# Patient Record
Sex: FEMALE | HISPANIC OR LATINO
[De-identification: names, ages, dates, MRNs, and addresses within clinical notes are randomized per-mention and may not be internally consistent; named-entity substitution may affect disease eponyms.]

---
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
## 2019-08-04 ENCOUNTER — NURSE ONLY (OUTPATIENT)
Dept: CARDIOLOGY CLINIC | Age: 19
End: 2019-08-04
Payer: COMMERCIAL

## 2019-08-04 DIAGNOSIS — Z02.5 ROUTINE SPORTS PHYSICAL EXAM: Primary | ICD-10-CM

## 2019-08-04 PROCEDURE — 93000 ELECTROCARDIOGRAM COMPLETE: CPT | Performed by: INTERNAL MEDICINE

## 2019-10-25 ENCOUNTER — HOSPITAL ENCOUNTER (OUTPATIENT)
Dept: PHYSICAL THERAPY | Age: 19
Setting detail: THERAPIES SERIES
Discharge: HOME | End: 2019-10-25
Payer: COMMERCIAL

## 2019-10-25 PROCEDURE — 97110 THERAPEUTIC EXERCISES: CPT

## 2019-10-25 PROCEDURE — 97161 PT EVAL LOW COMPLEX 20 MIN: CPT

## 2019-10-25 PROCEDURE — 97140 MANUAL THERAPY 1/> REGIONS: CPT

## 2019-10-28 ENCOUNTER — HOSPITAL ENCOUNTER (OUTPATIENT)
Dept: PHYSICAL THERAPY | Age: 19
Setting detail: THERAPIES SERIES
Discharge: HOME | End: 2019-10-28
Payer: COMMERCIAL

## 2019-10-28 PROCEDURE — 97110 THERAPEUTIC EXERCISES: CPT

## 2019-10-28 PROCEDURE — 97140 MANUAL THERAPY 1/> REGIONS: CPT

## 2019-11-04 ENCOUNTER — HOSPITAL ENCOUNTER (OUTPATIENT)
Dept: PHYSICAL THERAPY | Age: 19
Setting detail: THERAPIES SERIES
Discharge: HOME | End: 2019-11-04
Payer: COMMERCIAL

## 2019-11-04 PROCEDURE — 97110 THERAPEUTIC EXERCISES: CPT

## 2019-11-04 PROCEDURE — 97140 MANUAL THERAPY 1/> REGIONS: CPT

## 2019-11-11 ENCOUNTER — HOSPITAL ENCOUNTER (OUTPATIENT)
Dept: PHYSICAL THERAPY | Age: 19
Setting detail: THERAPIES SERIES
Discharge: HOME | End: 2019-11-11
Payer: COMMERCIAL

## 2019-11-18 ENCOUNTER — HOSPITAL ENCOUNTER (OUTPATIENT)
Dept: PHYSICAL THERAPY | Age: 19
Setting detail: THERAPIES SERIES
Discharge: HOME | End: 2019-11-18
Payer: COMMERCIAL

## 2019-11-20 ENCOUNTER — HOSPITAL ENCOUNTER (OUTPATIENT)
Dept: PHYSICAL THERAPY | Age: 19
Setting detail: THERAPIES SERIES
Discharge: HOME | End: 2019-11-20
Payer: COMMERCIAL

## 2019-11-20 PROCEDURE — 97110 THERAPEUTIC EXERCISES: CPT

## 2019-11-20 PROCEDURE — 97140 MANUAL THERAPY 1/> REGIONS: CPT

## 2019-11-25 ENCOUNTER — HOSPITAL ENCOUNTER (OUTPATIENT)
Dept: PHYSICAL THERAPY | Age: 19
Setting detail: THERAPIES SERIES
Discharge: HOME | End: 2019-11-25
Payer: COMMERCIAL

## 2019-11-25 PROCEDURE — 97110 THERAPEUTIC EXERCISES: CPT

## 2019-11-25 PROCEDURE — 97140 MANUAL THERAPY 1/> REGIONS: CPT

## 2019-12-04 ENCOUNTER — HOSPITAL ENCOUNTER (OUTPATIENT)
Dept: PHYSICAL THERAPY | Age: 19
Setting detail: THERAPIES SERIES
Discharge: HOME | End: 2019-12-04
Payer: COMMERCIAL

## 2019-12-04 PROCEDURE — 97110 THERAPEUTIC EXERCISES: CPT

## 2020-12-03 ENCOUNTER — HOSPITAL ENCOUNTER (OUTPATIENT)
Dept: PHYSICAL THERAPY | Age: 20
Setting detail: THERAPIES SERIES
Discharge: HOME | End: 2020-12-03
Payer: COMMERCIAL

## 2020-12-03 PROCEDURE — 97140 MANUAL THERAPY 1/> REGIONS: CPT

## 2020-12-03 PROCEDURE — 97161 PT EVAL LOW COMPLEX 20 MIN: CPT

## 2020-12-03 NOTE — FLOWSHEET NOTE
Saccades-Horizontal     Saccades-Vertical     VOR-Hoirzontal     VOR-Vertical     Convergence     Visual Motion Sensitivity  (VMS)                 Therapeutic Ex: 3 min Sets/sec Reps Notes   UBE      T- band Row/pinch      T- band lower pinch      T- band ER activation      UT stretch      Levator stretch      Isometric at wall      Quadruped w cerv retract      Front plank      Side plank      Chin tuck      Chin tuck w lift      Chin tuck w rotation 20 4                Manual Intervention: 20 min      Cerv mobs/manip 1 8    Thoracic mobs/manip 1 4    CT manip 1 4    Rib mobilizations      STM 1 4    DN            NMR re-education      T-spine Ext- foam roll      Chin tucks       No money      Wall Postural re-ed                      Therapeutic Exercise and NMR EXR  [x] (24064) Provided verbal/tactile cueing for activities related to strengthening, flexibility, endurance, ROM  for improvements in cervical, postural, scapular, scapulothoracic and UE control with self care, reaching, carrying, lifting, house/yardwork, driving/computer work. [x] (70694) Provided verbal/tactile cueing for activities related to improving balance, coordination, kinesthetic sense, posture, motor skill, proprioception  to assist with cervical, scapular, scapulothoracic and UE control with self care, reaching, carrying, lifting, house/yardwork, driving/computer work. Therapeutic Activities:    [] (51516 or 90848) Provided verbal/tactile cueing for activities related to improving balance, coordination, kinesthetic sense, posture, motor skill, proprioception and motor activation to allow for proper function of cervical, scapular, scapulothoracic and UE control with self care, carrying, lifting, driving/computer work.      Home Exercise Program:    [x] (19318) Reviewed/Progressed HEP activities related to strengthening, flexibility, endurance, ROM of cervical, scapular, scapulothoracic and UE control with self care, reaching, carrying, lifting, house/yardwork, driving/computer work  [] (08075) Reviewed/Progressed HEP activities related to improving balance, coordination, kinesthetic sense, posture, motor skill, proprioception of cervical, scapular, scapulothoracic and UE control with self care, reaching, carrying, lifting, house/yardwork, driving/computer work      Manual Treatments:  PROM / STM / Oscillations-Mobs:  G-I, II, III, IV (PA's, Inf., Post.)  [x] (80992) Provided manual therapy to mobilize soft tissue/joints of cervical/CT, scapular GHJ and UE for the purpose of decreasing headache, modulating pain, promoting relaxation,  increasing ROM, reducing/eliminating soft tissue swelling/inflammation/restriction, improving soft tissue extensibility and allowing for proper ROM for normal function with self care, reaching, carrying, lifting, house/yardwork, driving/computer work    Modalities:      Charges:  Timed Code Treatment Minutes: 20   Total Treatment Minutes: 45     [x] EVAL (LOW) 27498 (typically 20 minutes face-to-face)  [] EVAL (MOD) 91251 (typically 30 minutes face-to-face)  [] EVAL (HIGH) 63506 (typically 45 minutes face-to-face)  [] RE-EVAL     [] WH(89328) x     [] Dry needle 1 or 2 Muscles (04579)  [] NMR (92369) x     [] Dry needle 3+ Muscles (07785)  [x] Manual (96770) x  1     [] TA (86857) x     [] Mech Traction (61968)  [] ES(attended) (36678)     [] ES (un) (19659):   [] VASO (98768)  [] Other:      If [facility] Please Indicate Time In/Out  CPT Code Time in Time out                                     GOALS:  Patient stated goal: improve mobility; sleep better  [] Progressing: [] Met: [] Not Met: [] Adjusted    Therapist goals for Patient:   Short Term Goals: To be achieved in: 2 weeks  1. Independent in HEP and progression per patient tolerance, in order to prevent re-injury. [] Progressing: [] Met: [] Not Met: [] Adjusted  2.  Patient will have a decrease in pain and concussion symptoms to facilitate improvement in movement, function, and ADLs as indicated by Functional Deficits. [] Progressing: [] Met: [] Not Met: [] Adjusted    Long Term Goals: To be achieved in: 2 weeks  1. Disability index score of 0% or less for the NDI to assist with reaching prior level of function. [] Progressing: [] Met: [] Not Met: [] Adjusted  2. Patient will demonstrate increased AROM to [facility] of cervical/thoracic spine to allow for proper joint functioning as indicated by patients Functional Deficits. [] Progressing: [] Met: [] Not Met: [] Adjusted  3. Patient will demonstrate an increase in postural awareness and control and activation of  Deep cervical stabilizers to allow for proper functional mobility as indicated by patients Functional Deficits. [] Progressing: [] Met: [] Not Met: [] Adjusted  4. Patient will return to lifting/reaching functional activities without increased symptoms or restriction. [] Progressing: [] Met: [] Not Met: [] Adjusted  5. Patient to have normalized concussion symptoms to facilitate participation in movement, function, ADLs. [] Progressing: [] Met: [] Not Met: [] Adjusted   6. Patient will report being able to sleep without increased symptoms or restriction. (patient specific functional goal)    [] Progressing: [] Met: [] Not Met: [] Adjusted     ASSESSMENT:  See eval    Treatment/Activity Tolerance:  [x] Patient tolerated treatment well [] Patient limited by fatique  [] Patient limited by pain  [] Patient limited by other medical complications  [] Other:     Overall Progression Towards Functional goals/ Treatment Progress Update:  [] Patient is progressing as expected towards functional goals listed. [] Progression is slowed due to complexities/Impairments listed. [] Progression has been slowed due to co-morbidities.   [x] Plan just implemented, too soon to assess goals progression <30days   [] Goals require adjustment due to lack of progress  [] Patient is not progressing as expected and requires additional follow up with physician  [] Other    Prognosis for POC: [x] Good [] Fair  [] Poor    Patient requires continued skilled intervention: [x] Yes  [] No        PLAN: See eval  [] Continue per plan of care [] Alter current plan (see comments)  [x] Plan of care initiated [] Hold pending MD visit [] Discharge    Electronically signed by: Nishi Bryant PT    Note: If patient does not return for scheduled/recommended follow up visits, this note will serve as a discharge from care along with the most recent update on progress.

## 2020-12-03 NOTE — PLAN OF CARE
[place] [address] [place] [address]ra  Phone: [phone]  Fax:     [phone]         Physical Therapy Certification    Dear Referring Practitioner: Dr. Floyd Aguiar,    We had the pleasure of evaluating the following patient for physical therapy services at [address] [place] [address]. A summary of our findings can be found in the initial assessment below. This includes our plan of care. If you have any questions or concerns regarding these findings, please do not hesitate to contact me at the office phone number checked above. Thank you for the referral.       Physician Signature:_______________________________Date:__________________  By signing above (or electronic signature), therapists plan is approved by physician            Patient: Natalie Green   : 2000   MRN: [id number]  Referring Physician: Referring Practitioner: Dr. Floyd Aguiar      Evaluation Date: 12/3/2020      Medical Diagnosis Information:  Diagnosis: concussion, cervical sprin, neck pain   Treatment Diagnosis: concussion S06.0X0A, neck pain M54.2, cervical sprain S13. 4XXA                                         Insurance information: PT Insurance Information: Cign[employer]/AG/Golden    Precautions/ Contra-indications: none  Latex Allergy:  [x]NO      []YES  Preferred Language for Healthcare:   [x]English       []other:    C-SSRS Triggered by Intake questionnaire (Past 2 wk assessment ):   [x] No, Questionnaire did not trigger screening.   [] Yes, Patient intake triggered C-SSRS Screening      [] C-SSRS Screening completed  [] PCP notified via Epic     SUBJECTIVE: Patient stated complaint: Patient reports that she fell on the ice 2020 and hit her chin on the ice, which resulted in head whipping back. Patient reports that initially she had HA, increased light sensitivity, foggy feeling. Denied dizziness or sound sensitivity. Most of her concussion symptoms resolved 2 days ago, except for HA. Mostly has R sided neck pain and feels like her HA is a result of this. Her  worked on some soft tissue in neck over the past week which has helped quite a bit. Will be leaving to go home this weekend as her season just got canceled last night. Difficulty sleeping.      Relevant Medical History:prior concussion/cervical strain 2019  Functional Scale/Score: DHI 0% disability, NDI 26% disability    Pain Scale: 4/10  Easing factors: lying down  Provocative factors: moving neck     Type: [x]Constant   [x]Intermittent  []Radiating []Localized []other:     Numbness/Tingling: none    Occupation/School:student, [employer]     Living Status/Prior Level of Function: Independent with ADLs and IADLs, varsity skating    OBJECTIVE:     Special Test Results Symptoms and Time until Resolution of Symptoms   Dizziness Handicap Inventory 0% disability    STUART Level ground- 4 errors  Unlevel ground- 5 errors                  VOMS Not Tested Headache 0-10 Dizziness 0-10 Nausea 0-10 Fogginess 0-10 Comments   Baseline Symptoms:  4/10 0 0 1    Smooth Pursuits  4 0 0 1 Vertical and horizontal   Saccades-Horizontal    4 0 0 1    Saccades-Vertical  4 0 0 1    Convergence (near Point)      Near Point in cm  Measure 1:  Measure 2:  Measure 3:                  CERV ROM     Cervical Flexion 40    Cervical Extension 50    Cervical SB 30 c R sided neck pain 45   Cervical rotation 65 65               Special Test Right  Left   Spurlings     Sharp Pursor     VBI      Joint Mobility restricted normal   General Myotomes testing (listed deficits)                   Reflexes Normal Abnormal Comments   [x]Reflexes equal and normal bilaterally      S1-2 Seated achilles [] []    S1-2 Prone knee bend [] []    L3-4 Patellar tendon [] []    C5-6 Biceps [] []    C6 Brachioradialis [] []    C7-8 Triceps [] []    Clonus [] []    Babinski [] []    Gee's [] [] Dermatomes/Myotomes:   [x]Dermatomes/Myotomes intact    []Other:    Joint mobility: cervical spine   []Normal    [x]Hypo   []Hyper    Palpation: tender with palpation along TP R>L cervical spine, increased tenderness R upper cervical    Functional Mobility/Transfers: limited in sleeping, position changes, lifting    Posture: WNL    Bandages/Dressings/Incisions: NA    Gait: (include devices/WB status): WNL     Orthopedic Special Tests: n/a                       [x] Patient history, allergies, meds reviewed. Medical chart reviewed. See intake form. Review Of Systems (ROS):  [x]Performed Review of systems (Integumentary, CardioPulmonary, Neurological) by intake and observation. Intake form has been scanned into medical record. Patient has been instructed to contact their primary care physician regarding ROS issues if not already being addressed at this time.       Co-morbidities/Complexities (which will affect course of rehabilitation):   [x]None           Arthritic conditions   []Rheumatoid arthritis (M05.9)  []Osteoarthritis (M19.91)   Cardiovascular conditions   []Hypertension (I10)  []Hyperlipidemia (E78.5)  []Angina pectoris (I20)  []Atherosclerosis (I70)  []CVA Musculoskeletal conditions   []Disc pathology   []Congenital spine pathologies   []Prior surgical intervention  []Osteoporosis (M81.8)  []Osteopenia (M85.8)   Endocrine conditions   []Hypothyroid (E03.9)  []Hyperthyroid Gastrointestinal conditions   []Constipation (G47.62)   Metabolic conditions   []Morbid obesity (E66.01)  []Diabetes type 1(E10.65) or 2 (E11.65)   []Neuropathy (G60.9)     Pulmonary conditions   []Asthma (J45)  []Coughing   []COPD (J44.9)   Psychological Disorders  []Anxiety (F41.9)  []Depression (F32.9)   []Other:   []Other:          Barriers to/and or personal factors that will affect rehab potential:              []Age  []Sex   []Smoker              []Motivation/Lack of Motivation                        []Co-Morbidities []Cognitive Function, education/learning barriers              []Environmental, home barriers              []profession/work barriers  []past PT/medical experience  []other:  Justification:     Falls Risk Assessment (30 days):   [x] Falls Risk assessed and no intervention required. [] Falls Risk assessed and Patient requires intervention due to being higher risk   TUG score (>12s at risk):     [] Falls education provided, including         ASSESSMENT: Patient is a 22 yo female who presents to therapy s/p hitting head sustaining concussion and cervical strain. Upon assessment, patient with decreased cervical ROM, joint mobility and flexibility of cervical musculature. Patient with mild limitation in balance. No issues with oculomotor assessment. Patient with improved cervical ROM to 60 degrees flexion , 60 degrees extension and 40 degrees L SB at conclusion of session. Patient will benefit from continued skilled PT services, however will be returning home as her season just got canceled and will not return until late January. Discussed with patient the need to continue with PT services at home to ensure proper restoration of cervical mobility. If patient needs further services once she returns to [place], then will reassess and resume at that time.       Functional Impairments:     [x]Noted cervical/thoracic/GHJ joint hypomobility   []Noted cervical/thoracic/GHJ joint hypermobility   [x]Decreased cervical/UE functional ROM   [x]Noted Headache pain aggravated by neck movements with/without dizziness   []Abnormal reflexes/sensation/myotomal/dermatomal deficits   [x]Decreased DCF control or ability to hold head up   []Decreased RC/scapular/core strength and neuromuscular control    []Decreased UE functional strength   []other:      Functional Activity Limitations (from functional questionnaire and intake)   [x]Reduced ability to tolerate prolonged functional positions   []Reduced ability or difficulty with changes of positions or transfers between positions   []Reduced ability to maintain good posture and demonstrate good body mechanics with sitting, bending, and lifting   [] Reduced ability or tolerance with driving and/or computer work   [x]Reduced ability to perform lifting, reaching, carrying tasks   []Reduced ability to concentrate   [x]Reduced ability to sleep    []Reduced ability to tolerate any impact through UE or spine   []Reduced ability to ambulate prolonged functional periods/distances   []other:    Participation Restrictions   []Reduced participation in self care activities   []Reduced participation in home management activities   []Reduced participation in work activities   []Reduced participation in social activities. [x]Reduced participation in sport/recreational activities. Classification/Subgrouping:   [x]signs/symptoms consistent with neck pain with mobility deficits     []signs/symptoms consistent with neck pain with movement coordinated impairments    []signs/symptoms consistent with neck pain with radiating pain    [x]signs/symptoms consistent with neck pain with headaches (cervicogenic)    []Signs/symptoms consistent with nerve root involvement including myotome & dermatome dysfunction   [x]sign/symptoms consistent with facet dysfunction of cervical and thoracic spine    []signs/symptoms consistent suggesting central cord compression/UMN syndromes   []signs/symptoms consistent with discogenic cervical pain   []signs/symptoms consistent with rib dysfunction   []signs/symptoms consistent with postural dysfunction   []signs/symptoms consistent with shoulder pathology    []signs/symptoms consistent with post-surgical status including decreased ROM, strength and function.    []signs/symptoms consistent with pathology which may benefit from Dry Needling   []signs/symptoms which may limit the use of advanced manual therapy techniques: (Elevated CV risk profile, recent trauma, intolerance to end range positions, prior TIA, visual issues, UE neurological compromise )     Prognosis/Rehab Potential:      []Excellent   [x]Good    []Fair   []Poor    Tolerance of evaluation/treatment:    []Excellent   [x]Good    []Fair   []Poor    Physical Therapy Evaluation Complexity Justification  [x] A history of present problem with:  [x] no personal factors and/or comorbidities that impact the plan of care;  []1-2 personal factors and/or comorbidities that impact the plan of care  []3 personal factors and/or comorbidities that impact the plan of care  [x] An examination of body systems using standardized tests and measures addressing any of the following: body structures and functions (impairments), activity limitations, and/or participation restrictions;:  [x] a total of 1-2 or more elements   [] a total of 3 or more elements   [] a total of 4 or more elements   [x] A clinical presentation with:  [x] stable and/or uncomplicated characteristics   [] evolving clinical presentation with changing characteristics  [] unstable and unpredictable characteristics;   [x] Clinical decision making of [x] low, [] moderate, [] high complexity using standardized patient assessment instrument and/or measurable assessment of functional outcome. [x] EVAL (LOW) 07966 (typically 20 minutes face-to-face)  [] EVAL (MOD) 70970 (typically 30 minutes face-to-face)  [] EVAL (HIGH) 55171 (typically 45 minutes face-to-face)  [] RE-EVAL     PLAN:   Frequency/Duration:  1 days per week for 1 Weeks:  Interventions:  [x]  Therapeutic exercise including: strength training, ROM, for cervical spine,scapula, core and Upper extremity, including postural re-education. [x]  NMR activation and proprioception for Deep cervical flexors, periscapular and RC muscles and Core, including postural re-education. [x]  Manual therapy as indicated for C/T spine, ribs, Soft tissue to include: Dry Needling/IASTM, STM, PROM, Gr I-IV mobilizations, manipulation.    [x] Modalities as needed that may include: thermal agents, E-stim, Biofeedback, US, iontophoresis as indicated  [x] Patient education on joint protection, postural re-education, activity modification, progression of HEP. HEP instruction: chin tuck, upper cervical rotation stretch, R UT stretch, t/s extension stretch (see scanned forms)    GOALS:  Patient stated goal: improve mobility; sleep better  [] Progressing: [] Met: [] Not Met: [] Adjusted    Therapist goals for Patient:   Short Term Goals: To be achieved in: 2 weeks  1. Independent in HEP and progression per patient tolerance, in order to prevent re-injury. [] Progressing: [] Met: [] Not Met: [] Adjusted  2. Patient will have a decrease in pain and concussion symptoms to facilitate improvement in movement, function, and ADLs as indicated by Functional Deficits. [] Progressing: [] Met: [] Not Met: [] Adjusted    Long Term Goals: To be achieved in: 2 weeks  1. Disability index score of 0% or less for the NDI to assist with reaching prior level of function. [] Progressing: [] Met: [] Not Met: [] Adjusted  2. Patient will demonstrate increased AROM to [facility] of cervical/thoracic spine to allow for proper joint functioning as indicated by patients Functional Deficits. [] Progressing: [] Met: [] Not Met: [] Adjusted  3. Patient will demonstrate an increase in postural awareness and control and activation of  Deep cervical stabilizers to allow for proper functional mobility as indicated by patients Functional Deficits. [] Progressing: [] Met: [] Not Met: [] Adjusted  4. Patient will return to lifting/reaching functional activities without increased symptoms or restriction. [] Progressing: [] Met: [] Not Met: [] Adjusted  5. Patient to have normalized concussion symptoms to facilitate participation in movement, function, ADLs. [] Progressing: [] Met: [] Not Met: [] Adjusted   6.  Patient will report being able to sleep without increased symptoms or restriction. (patient specific functional goal)    [] Progressing: [] Met: [] Not Met: [] Adjusted     Electronically signed by:  Xavier Marti PT